# Patient Record
Sex: FEMALE | URBAN - METROPOLITAN AREA
[De-identification: names, ages, dates, MRNs, and addresses within clinical notes are randomized per-mention and may not be internally consistent; named-entity substitution may affect disease eponyms.]

---

## 2017-11-13 ENCOUNTER — TRANSFERRED RECORDS (OUTPATIENT)
Dept: HEALTH INFORMATION MANAGEMENT | Facility: CLINIC | Age: 47
End: 2017-11-13

## 2017-11-13 LAB
ALT SERPL-CCNC: 17 U/L (ref 6–29)
AST SERPL-CCNC: 21 U/L (ref 10–35)
CHOLEST SERPL-MCNC: 153 MG/DL
CREAT SERPL-MCNC: 0.52 MG/DL (ref 0.5–1.1)
GFR SERPL CREATININE-BSD FRML MDRD: 112 ML/MIN/1.73M2
GLUCOSE SERPL-MCNC: 120 MG/DL (ref 65–99)
HBA1C MFR BLD: 5.7 % (ref 0–5.7)
HDLC SERPL-MCNC: 52 MG/DL
HEP C HIM: NORMAL
LDLC SERPL CALC-MCNC: 83 MG/DL
NONHDLC SERPL-MCNC: 101 MG/DL
POTASSIUM SERPL-SCNC: 4 MMOL/L (ref 3.5–5.3)
TRIGL SERPL-MCNC: 89 MG/DL
TSH SERPL-ACNC: 6.1 MIU/L

## 2018-08-09 ENCOUNTER — TRANSFERRED RECORDS (OUTPATIENT)
Dept: HEALTH INFORMATION MANAGEMENT | Facility: CLINIC | Age: 48
End: 2018-08-09

## 2018-08-09 LAB
GLUCOSE SERPL-MCNC: 107 MG/DL (ref 65–99)
HBA1C MFR BLD: 5.6 % (ref 0–5.7)
TSH SERPL-ACNC: 3.36 MIU/L (ref 0.4–4.5)

## 2019-03-25 ENCOUNTER — OFFICE VISIT (OUTPATIENT)
Dept: FAMILY MEDICINE | Facility: CLINIC | Age: 49
End: 2019-03-25
Payer: COMMERCIAL

## 2019-03-25 ENCOUNTER — TRANSFERRED RECORDS (OUTPATIENT)
Dept: HEALTH INFORMATION MANAGEMENT | Facility: CLINIC | Age: 49
End: 2019-03-25

## 2019-03-25 VITALS
HEART RATE: 80 BPM | WEIGHT: 177 LBS | BODY MASS INDEX: 31.36 KG/M2 | SYSTOLIC BLOOD PRESSURE: 153 MMHG | TEMPERATURE: 98.4 F | HEIGHT: 63 IN | DIASTOLIC BLOOD PRESSURE: 69 MMHG

## 2019-03-25 DIAGNOSIS — Z12.11 SPECIAL SCREENING FOR MALIGNANT NEOPLASMS, COLON: ICD-10-CM

## 2019-03-25 DIAGNOSIS — E03.9 HYPOTHYROIDISM, UNSPECIFIED TYPE: Primary | ICD-10-CM

## 2019-03-25 DIAGNOSIS — Z12.31 ENCOUNTER FOR SCREENING MAMMOGRAM FOR BREAST CANCER: ICD-10-CM

## 2019-03-25 DIAGNOSIS — D50.9 IRON DEFICIENCY ANEMIA, UNSPECIFIED IRON DEFICIENCY ANEMIA TYPE: ICD-10-CM

## 2019-03-25 DIAGNOSIS — L72.3 SEBACEOUS CYST: ICD-10-CM

## 2019-03-25 DIAGNOSIS — Z13.6 CARDIOVASCULAR SCREENING; LDL GOAL LESS THAN 160: ICD-10-CM

## 2019-03-25 LAB
BASOPHILS # BLD AUTO: 0.1 10E9/L (ref 0–0.2)
BASOPHILS NFR BLD AUTO: 1 %
CHOLEST SERPL-MCNC: 161 MG/DL
DIFFERENTIAL METHOD BLD: ABNORMAL
EOSINOPHIL # BLD AUTO: 0.1 10E9/L (ref 0–0.7)
EOSINOPHIL NFR BLD AUTO: 1.8 %
ERYTHROCYTE [DISTWIDTH] IN BLOOD BY AUTOMATED COUNT: 23.7 % (ref 10–15)
FERRITIN SERPL-MCNC: 2 NG/ML (ref 8–252)
HBA1C MFR BLD: 5.7 % (ref 0–5.6)
HCT VFR BLD AUTO: 31.3 % (ref 35–47)
HDLC SERPL-MCNC: 55 MG/DL
HGB BLD-MCNC: 8.2 G/DL (ref 11.7–15.7)
HGB BLD-MCNC: 8.3 G/DL (ref 11.7–15.7)
IMM GRANULOCYTES # BLD: 0 10E9/L (ref 0–0.4)
IMM GRANULOCYTES NFR BLD: 0.4 %
LDLC SERPL CALC-MCNC: 85 MG/DL
LYMPHOCYTES # BLD AUTO: 1 10E9/L (ref 0.8–5.3)
LYMPHOCYTES NFR BLD AUTO: 19.4 %
MCH RBC QN AUTO: 18 PG (ref 26.5–33)
MCHC RBC AUTO-ENTMCNC: 26.5 G/DL (ref 31.5–36.5)
MCV RBC AUTO: 68 FL (ref 78–100)
MONOCYTES # BLD AUTO: 0.3 10E9/L (ref 0–1.3)
MONOCYTES NFR BLD AUTO: 5.5 %
NEUTROPHILS # BLD AUTO: 3.6 10E9/L (ref 1.6–8.3)
NEUTROPHILS NFR BLD AUTO: 71.9 %
NONHDLC SERPL-MCNC: 106 MG/DL
NRBC # BLD AUTO: 0 10*3/UL
NRBC BLD AUTO-RTO: 0 /100
PLATELET # BLD AUTO: 195 10E9/L (ref 150–450)
RBC # BLD AUTO: 4.62 10E12/L (ref 3.8–5.2)
TRIGL SERPL-MCNC: 103 MG/DL
TSH SERPL DL<=0.005 MIU/L-ACNC: 3.02 MU/L (ref 0.4–4)
WBC # BLD AUTO: 4.9 10E9/L (ref 4–11)

## 2019-03-25 PROCEDURE — 36415 COLL VENOUS BLD VENIPUNCTURE: CPT | Performed by: PHYSICIAN ASSISTANT

## 2019-03-25 PROCEDURE — 85018 HEMOGLOBIN: CPT | Mod: 59 | Performed by: PHYSICIAN ASSISTANT

## 2019-03-25 PROCEDURE — 80061 LIPID PANEL: CPT | Performed by: PHYSICIAN ASSISTANT

## 2019-03-25 PROCEDURE — 84443 ASSAY THYROID STIM HORMONE: CPT | Performed by: PHYSICIAN ASSISTANT

## 2019-03-25 PROCEDURE — 83036 HEMOGLOBIN GLYCOSYLATED A1C: CPT | Performed by: PHYSICIAN ASSISTANT

## 2019-03-25 PROCEDURE — 82728 ASSAY OF FERRITIN: CPT | Performed by: PHYSICIAN ASSISTANT

## 2019-03-25 PROCEDURE — 85025 COMPLETE CBC W/AUTO DIFF WBC: CPT | Performed by: PHYSICIAN ASSISTANT

## 2019-03-25 PROCEDURE — 99204 OFFICE O/P NEW MOD 45 MIN: CPT | Performed by: PHYSICIAN ASSISTANT

## 2019-03-25 SDOH — HEALTH STABILITY: MENTAL HEALTH: HOW OFTEN DO YOU HAVE A DRINK CONTAINING ALCOHOL?: NEVER

## 2019-03-25 ASSESSMENT — MIFFLIN-ST. JEOR: SCORE: 1384.06

## 2019-03-25 NOTE — PATIENT INSTRUCTIONS
"Labs today - I will have the results in the next few days and we will call or send a letter        To schedule the mammogram (breast cancer screening) - call [phone]      To schedule the colonoscopy (colon cancer screening) - call [phone]      If you would like to have the lump on the back removed, call general surgery at [phone]      *All of the above can be done at [facility] in [place], [place] (~15 minutes north of this clinic)      Patient Education   La colonoscopía  Lo que usted necesita saber  Por favor venga con un adulto que le pueda llevar a casa después del examen y quedarse con usted ramsey las seis horas siguientes. Los medicamentos utilizados para el estudio le provocarán sueño. No podrá conducir ni podrá мария un autobús o taxi sin acompañante.   Qué es la colonoscopía?  Es un estudio que le permite al médico jc el interior del intestino grueso o colon. El médico introduce nader sonda con nader pequeña cámara (un endoscopio) al colon. La cámara está unida a un tubo nallely y flexible. La imagen obtenida se puede jc en nader pantalla de televisión.   Con la sonda introducirán aire en el intestino. De madie modo se abre el intestino para que el médico lo pueda jc mejor en la pantalla.  El estudio sirve para encontrar problemas tales isidra tejido inflamado, pólipos, úlceras, divertículos (pequeñas \"bolsas\" en la pared del colon) e indicaciones iniciales de cáncer.   Cómo sujit prepararme para el examen?  Consulte las instrucciones que le dieron para la limpieza del colon. Es muy importante que siga estas instrucciones detalladamente. Si nassar intestino se encuentra vacío y limpio, nassar estudio será de más utilidad y más rápido.   No olvide de informarle al médico o enfermero si tiene problemas de los ojos, enfermedad pulmonar o del corazón (incluyendo endocarditis o válvulas implantadas), diabetes, hepatitis o alergia a algún medicamento  Drew planes para llegar a horario para nassar estudio.    Venga con " ropa cómoda y suelta.    Traiga nassar tarjeta del seguro médico. Deje en casa nassar cartera, billetera, tarjetas de crédito, y otros objetos de valor.    Traiga nader lista de jami medicamentos y alergias conocidas. Si tiene marcapasos o desfibrilador cardioversor implantado, traiga consigo nassar tarjeta identificatoria.    Nos esforzamos por cumplir con los horarios, araceli puede shelley nader demora. Por favor traiga consigo un diario o un libro o algo que le entretenga mientras espera.   Cuál será el proceso cuando llegue?    Completará algunos formularios. Luego le llevaremos a un sector privado. Un enfermero examinará jami registros, y le hará algunas preguntas.    Se pondrá nader bata hospitalaria. Quizá le pidamos que se quite jami alhajas.    Repasaremos con usted los riesgos y beneficios del estudio. Deberá firmar nader autorización.    Le colocaremos un catéter intravenoso en nader vena de la mano o del brazo para darle los medicamentos.   Qué sucede ramsey el estudio?  El estudio demora entre 15 a 60 minutos. Usted puede hacerle jami preguntas al médico.    Estará recostado sobre nassar lado kervin en nader claudia.    Se le darán medicamentos para el dolor y para relajarlo.    El médico introducirá la sonda endoscópica por el recto (ano). La sonda es un tubo nallely y farias. El médico irá avanzando lentamente con la sonda por nassar intestino. El tubo es flexible, de modo que se adapta a las vueltas del intestino.    Quizá le pidamos que cambie de posiciones para ayudarlo al médico a desplazar la sonda.  Si encontramos pólipos (chela crecidas), las quitaremos. [place] es porque los pólipos pueden sangrar o convertirse en cáncer. Para algunos pólipos, sacaremos tejido para analizarlo en el laboratorio (nader biopsia). La mayoría de los pólipos son benignos, o sea no son cáncer.    Me dolerá?  No tendrá dolor, o solo muy poco. El aire le dará sensación de estar lleno, y notará algo de presión al pasar la sonda por el intestino. Dígale al  médico o enfermero si siente dolor. Si le dan cólicos, respire lentamente para ayudar a relajar el cuerpo.    Qué sucede después del estudio?    Lo llevaremos a la jessica de recuperación. Estará bajo observación por menos de nader hora o hasta que se la haya pasado philip todo el efecto de los medicamentos.    Le quitaremos el catéter intravenoso. Le darán un informe sobre nassar estudio    Por un tiempo puede que se sienta inflamado o con cólicos a causa del aire que le introdujeron. Antes de que se vaya a la casa ya debería comenzar a eliminar dea aire por el ano.    Nassar primer comida debe ser liviana. De a poco vuelva a nassar alimentación normal, a menos que nassar médico le indique lo contrario No katrina esfuerzos ramsey el susan del día.    Si le quitan un pólipo:  ? Evite esfuerzos físicos intensos por nader semana (no debe cargar cosas pesadas, o practicar deportes u otras actividades).  ? Evite comer nueces y palomitas de maíz ramsey nader semana.  ? Quizá tenga sangrado ramsey varios días después del estudio. Llame a nassar médico si el sangrado es intenso, o si jami heces (excremento) son negras o con danny.  ? Si usted normalmente manpreet aspirina o anticoagulantes, consulte con nassar médico cuándo debe recomenzarlas.    Es posible que le llegue más de nader factura por nassar estudio. (La clínica, el médico, el laboratorio, etc., cobran por separado)   Qué hago si necesito cambiar mi raghu?  Si no podrá asistir a nassar raghu, por favor llame lo antes posible. De dea modo podremos llamar al paciente que tiene raghu después que usted, ya que nuestra clínica atiende a muchas personas.   Colonoscopy  What You Should Know  Please arrive with an adult who can drive you home after the exam and stay with you for the next six hours. The medicines used in the exam will make you sleepy. You will not be able to drive. You cannot take a bus or taxi by yourself.  What is a colonoscopy?  A colonoscopy lets the doctor look inside your large intestine (colon).  The doctor guides a scope, or small camera, through the entire colon. The scope is attached to a long, flexible tube. The image from the scope appears on a large TV screen.   The scope will send air into your colon. This opens the colon so the doctor can see it better on the screen.  The exam looks for defects such as inflamed tissue, polyps, ulcers (sores), diverticula (pouches in the wall of the colon) and early signs of cancer.  How do I prepare for the exam?  Read your guidelines for cleansing the colon. It is highly important that you follow the directions closely. If your colon is empty and clean, your exam will be more thorough and take less time.   Be sure to tell your doctor or nurse if you have ever had eye, lung or heart disease (including endocarditis or an artificial valve); diabetes; hepatitis; or any allergies to medicines.  Plan to arrive on time for your exam.    Dress in comfortable, loose clothing.    Bring your insurance card. Leave your purse, billfold, credit cards and other valuables at home.    Bring a list of your medicines and known allergies. If you have a pacemaker or ICD, please bring your information card.    We do our best to stay on time, but there may be a delay. Please bring something to pass the time, such as a newspaper or book.  What happens when I arrive?    You will fill out some paperwork, then we will take you to a private area. A nurse will check your records and ask you questions.    You will change into a hospital gown. We may ask you to remove any jewelry.    We will review the risks and benefits of the exam. You will need to sign a consent form.    We will insert an IV (thin tube) into a vein in your hand or arm. We will use this to give you medicine.  What happens during the exam?  The exam takes from 15 minutes to one hour. You may ask questions of the doctor.    You will lie on your left side on a table.    You will receive medicines to relieve pain and help you  relax.    The doctor will insert the scope into your rectum (bottom). The scope is on a long, thin tube. The doctor will slowly guide the scope into your colon. The tube bends, so it can move through the curves of your colon.    We may ask you to change positions to help the doctor move the scope.  If we see any polyps (growths), we will remove them. We do this because polyps can cause bleeding or turn into cancer. For some polyps, we will take tissue (a biopsy) to test in the lab. Most polyps turn out to be benign (not cancer).   Will it hurt?  You should feel little or no discomfort. The air will make you will feel full, and you will notice some pressure as the tube passes through your colon. Tell your doctor or nurse if you feel any pain. If you have cramps, breathe slowly to help your body relax.   What happens after the exam?    We will take you to the recovery area. We will watch you for up to one hour or until most of the medicine has worn off.    We will remove the IV. You will receive a report about your exam.    You may feel bloated or crampy for a short time because of the air that was injected. You will need to be passing air before you go home.    Your first meal should be light. Slowly return to normal meals, unless your doctor gives you other orders. Take it easy the rest of the day.    If you had a polyp removed:  ? Avoid heavy exercise for one week (no heavy lifting, sports or other activity).      ? Avoid nuts and popcorn for one week.  ? You may have bleeding for several days after your exam. Call your doctor if bleeding is heavy or you have black or bloody stools (bowel movements).  ? If you normally take aspirin or blood thinners, ask your doctor when you can start taking them again.    You may receive more than one bill for your exam. (Separate charges may come from the clinic, doctor, lab, etc.).  What if I need to change my appointment?  If you cannot keep your appointment, please call us as  soon as you can. Our center is very busy, and we will need to contact the patient who comes after you.   Jeni material ha sido producido exclusivamente con fines informativos y no sustituye el asesoramiento de nassar médico.    2007 [facility]. Todos los derechos reservados. Producido en colaboración con [facility]. [employer] [id number] - REV 03/18.

## 2019-03-25 NOTE — LETTER
"March 27, 2019      Kasey Martinez  [address]  [place] [address]        Dear Kasey,     Here are the results of your latest labs     In terms of your cholesterol,   Your total cholesterol  should be less than 200.   Your total is 160     The total cholesterol is made up of your HDL and LDL.     The HDL is the \"good\" cholesterol and when it is high (over 60), it decreases the risk for heart attack and stroke.  When HDL is less than 40, it raises the risk for these problems.  You can raise the HDL by eating fish and by performing regular aerobic exercise (e.g. running, biking, swimming, aerobics).   Your HDL is 55     The LDL is the \"bad\" cholesterol linked to heart disease and stroke. For those who have heart disease or diabetes, their LDL should be less than 100.  For all others, the LDL should be less than 160.  You can lower this by following a low fat and low cholesterol diet.   Your LDL is 85     Your triglycerides should be less than 150.  You can lower these with a low fat diet, and for those with diabetes, by improving blood sugar control.   Your triglycerides are 103     For the diabetes screen we checked your A1c which is a 3 month \"average\" of your sugars. Anything less than 5.6% is normal. Anything greater than 6.5% is diabetes. Everything in between (which is where you fall) is considered pre diabetes and something we should carefully monitor. Diet and exercise can help keep this number from rising     Your thyroid tests were normal. Given you have been off the medication for 3 months and your numbers are still normal I would like to hold off on restarting the levothyroxine (what you were on before) and just check again in 3-6 months, especially given you have felt fine off the medication.     The only number that is still quite \"off\" is your hemoglobin. You are still very low at 8.2. I don't know what you were at when they checked you 6 months ago but this low level along with a low " ferritin (which is a measure of the iron stored in your body) indicates that we need to get you back on iron replacement. Please start taking ferrous sulfate or a similar iron supplement. Start at once daily and if tolerated, increase to 2 times daily.     I want to see you back in clinic in 2-3 months so we can recheck your hemoglobin and make sure the numbers are improving     Please contact clinic with any additional questions or concerns     Sincerely,    Roselyn Pulido PA-C /sc        Resulted Orders   TSH with free T4 reflex   Result Value Ref Range    TSH 3.02 0.40 - 4.00 mU/L   Hemoglobin   Result Value Ref Range    Hemoglobin 8.2 (L) 11.7 - 15.7 g/dL   Ferritin   Result Value Ref Range    Ferritin 2 (L) 8 - 252 ng/mL   Hemoglobin A1c   Result Value Ref Range    Hemoglobin A1C 5.7 (H) 0 - 5.6 %      Comment:      Normal <5.7% Prediabetes 5.7-6.4%  Diabetes 6.5% or higher - adopted from ADA   consensus guidelines.     Lipid panel reflex to direct LDL Fasting   Result Value Ref Range    Cholesterol 161 <200 mg/dL    Triglycerides 103 <150 mg/dL    HDL Cholesterol 55 >49 mg/dL    LDL Cholesterol Calculated 85 <100 mg/dL      Comment:      Desirable:       <100 mg/dl    Non HDL Cholesterol 106 <130 mg/dL                         CBC with platelets and differential   Result Value Ref Range    WBC 4.9 4.0 - 11.0 10e9/L    RBC Count 4.62 3.8 - 5.2 10e12/L    Hemoglobin 8.3 (L) 11.7 - 15.7 g/dL    Hematocrit 31.3 (L) 35.0 - 47.0 %    MCV 68 (L) 78 - 100 fl    MCH 18.0 (L) 26.5 - 33.0 pg    MCHC 26.5 (L) 31.5 - 36.5 g/dL    RDW 23.7 (H) 10.0 - 15.0 %    Platelet Count 195 150 - 450 10e9/L    Diff Method Automated Method     % Neutrophils 71.9 %    % Lymphocytes 19.4 %    % Monocytes 5.5 %    % Eosinophils 1.8 %    % Basophils 1.0 %    % Immature Granulocytes 0.4 %    Nucleated RBCs 0 0 /100    Absolute Neutrophil 3.6 1.6 - 8.3 10e9/L    Absolute Lymphocytes 1.0 0.8 - 5.3 10e9/L    Absolute Monocytes 0.3 0.0 - 1.3  10e9/L    Absolute Eosinophils 0.1 0.0 - 0.7 10e9/L    Absolute Basophils 0.1 0.0 - 0.2 10e9/L    Abs Immature Granulocytes 0.0 0 - 0.4 10e9/L    Absolute Nucleated RBC 0.0

## 2019-03-25 NOTE — PROGRESS NOTES
"  SUBJECTIVE:   Kasey Martinez is a 48 year old female who presents to clinic today for the following health issues:      Patient is here today to Establish Care with you.     -Patient has a bump on her upper back. She has been treated for it before and it went away. It came back 4 months ago. No pain but having some itching.     -She also would like labs done for her thyroid, anemia and diabetes.         Problem list and histories reviewed & adjusted, as indicated.  Additional history:    Lives in the area  Was previously going to [facility] in [place] - records requested today    Main concern today is a bump on her back that she noticed about 2-3 months ago  Had the same thing a year ago and was put on an antibiotic and it went away  Currently not bothering her  Not painful     Also would like to have bloodwork done  Was on synthroid up until 3 months ago when she went out - 50mcg     Also was put on iron 6 months ago for anemia  Stopped that about 1 month ago    Has felt fine off these medications    Family history of diabetes in her mom so would like to be screened    Mom (alive) - adult onset diabetes, HTN, high cholesterol  Dad () -  from lung CA due to exposure to chemicals in his work    Has 4 daughters, one of whom (Lesley) is here today with her    Mammogram - has not had  Colonoscopy - has not had  PAP - thinks she had one 2 years ago          No current outpatient medications on file.     BP Readings from Last 3 Encounters:   19 153/69    Wt Readings from Last 3 Encounters:   19 80.3 kg (177 lb)                    Reviewed and updated as needed this visit by clinical staff       Reviewed and updated as needed this visit by Provider         ROS:  Remainder of ROS obtained and found to be negative other than that which was documented above      OBJECTIVE:     /69   Pulse 80   Temp 98.4  F (36.9  C) (Tympanic)   Ht 1.588 m (5' 2.5\")   Wt 80.3 kg (177 lb)  "  BMI 31.86 kg/m    Body mass index is 31.86 kg/m .  GENERAL: healthy, alert and no distress  NECK: no adenopathy, no asymmetry, masses, or scars and thyroid normal to palpation  RESP: lungs clear to auscultation - no rales, rhonchi or wheezes  CV: regular rates and rhythm, normal S1 S2, no S3 or S4, no murmur, click or rub and no peripheral edema  SKIN: ~1.3cm well defined soft nodule in mid/upper back directly over spine. No surrounding redness. Not fluctuant    Diagnostic Test Results:  Labs pending    ASSESSMENT/PLAN:     (E03.9) Hypothyroidism, unspecified type  (primary encounter diagnosis)  Comment: was being treated. previously on 50mcg but has now been off for 3 months. Checking labs today  Plan: TSH with free T4 reflex            (Z13.6) CARDIOVASCULAR SCREENING; LDL GOAL LESS THAN 160  Comment:   Plan: Hemoglobin A1c, Lipid panel reflex to direct         LDL Fasting            (D50.9) Iron deficiency anemia, unspecified iron deficiency anemia type  Comment: on iron until 1 month ago. Repeat labs today  Plan: Hemoglobin, Ferritin            (L72.3) Sebaceous cyst  Comment: per patient, infected in the past but no sign of current infection and more convincing for a cyst vs boil/abscess. Discussed that if it is otherwise not bothering her, do not need to do anything but if it changes or begins to cause problems could look in to having it removed  Plan: GENERAL SURG ADULT REFERRAL            (Z12.31) Encounter for screening mammogram for breast cancer  Comment: due for screening  Plan: *MA Screening Digital Bilateral            (Z12.11) Special screening for malignant neoplasms, colon  Comment: reviewed purpose of colonoscopy and importance of screening. Given information and number to schedule  Plan: GASTROENTEROLOGY ADULT REF PROCEDURE ONLY            Per patient she had a PAP 2 years ago. Will sign release to get her records and update in the chart        Roselyn Pulido PA-C  [facility]

## 2019-05-29 ENCOUNTER — TELEPHONE (OUTPATIENT)
Dept: FAMILY MEDICINE | Facility: CLINIC | Age: 49
End: 2019-05-29

## 2019-05-29 DIAGNOSIS — Z13.9 SCREENING FOR CONDITION: Primary | ICD-10-CM

## 2019-07-23 ENCOUNTER — TELEPHONE (OUTPATIENT)
Dept: FAMILY MEDICINE | Facility: CLINIC | Age: 49
End: 2019-07-23

## 2019-07-23 NOTE — TELEPHONE ENCOUNTER
Panel Management Review      Patient has the following on her problem list: None      Composite cancer screening  Chart review shows that this patient is due/due soon for the following Pap Smear, Mammogram and Fecal Colorectal (FIT)  Summary:    Patient is due/failing the following:   FIT, MAMMOGRAM and PAP    Action needed:   We received records from previous clinic but there was no pap results - fax records request again asking for pap results. Patient stated in office visit she had pap about 2 years ago.   Fit was mailed to her recently.   Mammogram reminder given.     Type of outreach:    Sent letter. and requested pap records    Questions for provider review:    None                                                                                                                                    Toshia Sierra CMA       Chart routed to self .

## 2019-08-23 ENCOUNTER — TRANSFERRED RECORDS (OUTPATIENT)
Dept: HEALTH INFORMATION MANAGEMENT | Facility: CLINIC | Age: 49
End: 2019-08-23

## 2019-10-22 ENCOUNTER — TELEPHONE (OUTPATIENT)
Dept: FAMILY MEDICINE | Facility: CLINIC | Age: 49
End: 2019-10-22

## 2019-10-22 NOTE — TELEPHONE ENCOUNTER
Panel Management Review      Patient has the following on her problem list: None      Composite cancer screening  Chart review shows that this patient is due/due soon for the following Pap Smear and Mammogram  Summary:    Patient is due/failing the following:   FIT, MAMMOGRAM, PAP and PHYSICAL    Action needed:   Patient needs office visit for a physical and pap. and Patient needs referral/order: mammogram and FIT test    Type of outreach:    Phone, left message for patient to call back.     Questions for provider review:    None                                                                                                                                    Shantelle Chamorro CMA       Chart routed to self.

## 2019-12-18 ENCOUNTER — ALLIED HEALTH/NURSE VISIT (OUTPATIENT)
Dept: FAMILY MEDICINE | Facility: CLINIC | Age: 49
End: 2019-12-18
Payer: COMMERCIAL

## 2019-12-18 DIAGNOSIS — Z23 FLU VACCINE NEED: Primary | ICD-10-CM

## 2019-12-18 PROCEDURE — 90471 IMMUNIZATION ADMIN: CPT

## 2019-12-18 PROCEDURE — 99207 ZZC NO CHARGE NURSE ONLY: CPT

## 2019-12-18 PROCEDURE — 90686 IIV4 VACC NO PRSV 0.5 ML IM: CPT

## 2020-02-25 ENCOUNTER — TELEPHONE (OUTPATIENT)
Dept: FAMILY MEDICINE | Facility: CLINIC | Age: 50
End: 2020-02-25

## 2020-02-25 NOTE — TELEPHONE ENCOUNTER
Panel Management Review      Patient has the following on her problem list: None      Composite cancer screening  Chart review shows that this patient is due/due soon for the following Mammogram  Summary:    Patient is due/failing the following:   MAMMOGRAM    Action needed:   Patient needs referral/order: Mammogram     Type of outreach:    Sent letter.    Questions for provider review:    None                                                                                                                                    Jovan Nguyễn CMA       Chart routed to self .

## 2020-02-25 NOTE — LETTER
February 25, 2020      Kasey Martinez  [address]  [place] [address]        Dear Kasey,     As part of [place]'s commitment to health and wellness we have reviewed your chart and it indicates that you are due for one or more of the following:    -- Mammogram.    Please call one of the following numbers to schedule:  ** [facility] [phone] (at [facility] once a month)  [facility] [phone]  [facility] [phone]  [facility] [phone]  [facility] [phone]  [facility] [phone]        Please try to schedule and/or complete the tests above within the next 2-4 weeks.   The number to call to schedule an appointment at [facility] is [phone].    While we work hard to maintain accurate records, it is always possible that this notice does not accurately reflect tests that you may have had. To ensure that we do not send you unnecessary notices please verify that we have accurate dates of your tests (even if these were done many years ago) or if you are seeking care at another clinic.      Sincerely,     Roselyn Pulido PA-C, Jovan, CMA

## 2020-03-11 ENCOUNTER — HEALTH MAINTENANCE LETTER (OUTPATIENT)
Age: 50
End: 2020-03-11

## 2020-08-18 ENCOUNTER — TRANSFERRED RECORDS (OUTPATIENT)
Dept: HEALTH INFORMATION MANAGEMENT | Facility: CLINIC | Age: 50
End: 2020-08-18

## 2020-08-18 LAB — RETINOPATHY: NEGATIVE

## 2021-01-03 ENCOUNTER — HEALTH MAINTENANCE LETTER (OUTPATIENT)
Age: 51
End: 2021-01-03

## 2021-04-25 ENCOUNTER — HEALTH MAINTENANCE LETTER (OUTPATIENT)
Age: 51
End: 2021-04-25

## 2021-06-15 ENCOUNTER — TRANSFERRED RECORDS (OUTPATIENT)
Dept: HEALTH INFORMATION MANAGEMENT | Facility: CLINIC | Age: 51
End: 2021-06-15

## 2021-06-15 LAB — RETINOPATHY: NEGATIVE

## 2021-10-10 ENCOUNTER — HEALTH MAINTENANCE LETTER (OUTPATIENT)
Age: 51
End: 2021-10-10

## 2022-05-21 ENCOUNTER — HEALTH MAINTENANCE LETTER (OUTPATIENT)
Age: 52
End: 2022-05-21

## 2022-09-18 ENCOUNTER — HEALTH MAINTENANCE LETTER (OUTPATIENT)
Age: 52
End: 2022-09-18

## 2023-07-30 ENCOUNTER — HEALTH MAINTENANCE LETTER (OUTPATIENT)
Age: 53
End: 2023-07-30